# Patient Record
Sex: FEMALE | URBAN - METROPOLITAN AREA
[De-identification: names, ages, dates, MRNs, and addresses within clinical notes are randomized per-mention and may not be internally consistent; named-entity substitution may affect disease eponyms.]

---

## 2025-02-28 ENCOUNTER — LAB OUTSIDE AN ENCOUNTER (OUTPATIENT)
Dept: URBAN - METROPOLITAN AREA TELEHEALTH 2 | Facility: TELEHEALTH | Age: 55
End: 2025-02-28

## 2025-02-28 ENCOUNTER — OFFICE VISIT (OUTPATIENT)
Dept: URBAN - METROPOLITAN AREA TELEHEALTH 2 | Facility: TELEHEALTH | Age: 55
End: 2025-02-28
Payer: MEDICARE

## 2025-02-28 ENCOUNTER — DASHBOARD ENCOUNTERS (OUTPATIENT)
Age: 55
End: 2025-02-28

## 2025-02-28 VITALS
RESPIRATION RATE: 18 BRPM | HEIGHT: 64 IN | DIASTOLIC BLOOD PRESSURE: 74 MMHG | HEART RATE: 76 BPM | WEIGHT: 150 LBS | SYSTOLIC BLOOD PRESSURE: 164 MMHG | BODY MASS INDEX: 25.61 KG/M2 | TEMPERATURE: 99.7 F

## 2025-02-28 DIAGNOSIS — K74.69 OTHER CIRRHOSIS OF LIVER: ICD-10-CM

## 2025-02-28 DIAGNOSIS — Z86.0100 HX OF COLONIC POLYPS: ICD-10-CM

## 2025-02-28 PROBLEM — 19943007: Status: ACTIVE | Noted: 2025-02-28

## 2025-02-28 PROCEDURE — 99203 OFFICE O/P NEW LOW 30 MIN: CPT | Performed by: PHYSICIAN ASSISTANT

## 2025-02-28 RX ORDER — OXYCODONE HYDROCHLORIDE AND ACETAMINOPHEN 5; 325 MG/1; MG/1
TAKE 1 TABLET BY MOUTH EVERY 6 HOURS AS NEEDED AS NEEDED FOR PAIN UP TO FOR 3 DAYS TABLET ORAL
Qty: 12 EACH | Refills: 0 | Status: DISCONTINUED | COMMUNITY

## 2025-02-28 RX ORDER — PANTOPRAZOLE 40 MG/1
TAKE 1 TABLET BY MOUTH ONCE DAILY BEFORE BREAKFAST. DO NOT CRUSH, CHEW, OR SPLIT TABLET, DELAYED RELEASE ORAL
Qty: 30 EACH | Refills: 0 | Status: DISCONTINUED | COMMUNITY

## 2025-02-28 RX ORDER — SEVELAMER HYDROCHLORIDE 400 MG/1
TAKE 2 TABLETS BY MOUTH THREE TIMES DAILY WITH MEALS AND TAKE 1 TABLET WITH A SNACK TABLET, FILM COATED ORAL
Qty: 90 EACH | Refills: 1 | Status: ACTIVE | COMMUNITY

## 2025-02-28 RX ORDER — SODIUM BICARBONATE 650 MG/1
TAKE 2 TABLETS BY MOUTH ONCE DAILY IN THE MORNING 2 TABLETS IN THE AFTERNOON, AND 2 TABLETS AT BEDTIME TABLET ORAL
Qty: 180 EACH | Refills: 0 | Status: ACTIVE | COMMUNITY

## 2025-02-28 RX ORDER — DOCUSATE SODIUM 100 MG/1
TAKE 1 CAPSULE BY MOUTH TWICE DAILY CAPSULE, LIQUID FILLED ORAL
Qty: 10 EACH | Refills: 0 | Status: DISCONTINUED | COMMUNITY

## 2025-02-28 RX ORDER — CARVEDILOL 6.25 MG/1
TAKE 1 TABLET BY MOUTH WITH BREAKFAST AND WITH EVENING MEAL TABLET, FILM COATED ORAL
Qty: 60 EACH | Refills: 0 | Status: ACTIVE | COMMUNITY

## 2025-02-28 NOTE — HPI-ZZZTODAY'S VISIT
Patient has a complex past medical history.  She is currently on the list at [facility] in [place] for a liver kidney transplant.  In 2006 she had her gallbladder removed and ended up having a stone stuck in the bile duct.  Was cramping and pretty sick after that.  MRCP showed the stone but she also developed increased hypertension because of the pain.  They realized and found her having cirrhosis of an unknown etiology.  And her kidneys also fell from the high blood pressure.  She ended up with multiple different allergic reactions to different medications.  It is now very close on the list for her to get her double transplant.  Her last endoscopy colonoscopy were 5 years ago.  She had 2 polyps on her colonoscopy.  They are needing an updated one prior to getting the transplant.  She has 1 bowel movement with every meal so usually 3 bowel movements a day.  No bright red blood per rectum, no melena, no abdominal pain, no up nausea or vomiting, no family history of colon cancer.

## 2025-03-06 ENCOUNTER — TELEPHONE ENCOUNTER (OUTPATIENT)
Dept: URBAN - METROPOLITAN AREA CLINIC 80 | Facility: CLINIC | Age: 55
End: 2025-03-06

## 2025-03-10 ENCOUNTER — TELEPHONE ENCOUNTER (OUTPATIENT)
Dept: URBAN - METROPOLITAN AREA CLINIC 80 | Facility: CLINIC | Age: 55
End: 2025-03-10

## 2025-03-31 ENCOUNTER — OFFICE VISIT (OUTPATIENT)
Dept: URBAN - METROPOLITAN AREA MEDICAL CENTER 18 | Facility: MEDICAL CENTER | Age: 55
End: 2025-03-31

## 2025-03-31 ENCOUNTER — TELEPHONE ENCOUNTER (OUTPATIENT)
Dept: URBAN - METROPOLITAN AREA CLINIC 80 | Facility: CLINIC | Age: 55
End: 2025-03-31

## 2025-03-31 RX ORDER — SODIUM BICARBONATE 650 MG/1
TAKE 2 TABLETS BY MOUTH ONCE DAILY IN THE MORNING 2 TABLETS IN THE AFTERNOON, AND 2 TABLETS AT BEDTIME TABLET ORAL
Qty: 180 EACH | Refills: 0 | Status: ACTIVE | COMMUNITY

## 2025-03-31 RX ORDER — CARVEDILOL 6.25 MG/1
TAKE 1 TABLET BY MOUTH WITH BREAKFAST AND WITH EVENING MEAL TABLET, FILM COATED ORAL
Qty: 60 EACH | Refills: 0 | Status: ACTIVE | COMMUNITY

## 2025-03-31 RX ORDER — SEVELAMER HYDROCHLORIDE 400 MG/1
TAKE 2 TABLETS BY MOUTH THREE TIMES DAILY WITH MEALS AND TAKE 1 TABLET WITH A SNACK TABLET, FILM COATED ORAL
Qty: 90 EACH | Refills: 1 | Status: ACTIVE | COMMUNITY

## 2025-03-31 RX ORDER — LIDOCAINE HYDROCHLORIDE 20 MG/ML
15 ML AS NEEDED SOLUTION ORAL; TOPICAL
Qty: 105 ML | Refills: 1 | OUTPATIENT
Start: 2025-03-31 | End: 2025-04-14

## 2025-05-12 ENCOUNTER — OFFICE VISIT (OUTPATIENT)
Dept: URBAN - METROPOLITAN AREA MEDICAL CENTER 28 | Facility: MEDICAL CENTER | Age: 55
End: 2025-05-12

## 2025-08-15 ENCOUNTER — TELEPHONE ENCOUNTER (OUTPATIENT)
Dept: URBAN - METROPOLITAN AREA CLINIC 6 | Facility: CLINIC | Age: 55
End: 2025-08-15